# Patient Record
Sex: FEMALE | Race: WHITE | NOT HISPANIC OR LATINO
[De-identification: names, ages, dates, MRNs, and addresses within clinical notes are randomized per-mention and may not be internally consistent; named-entity substitution may affect disease eponyms.]

---

## 2018-06-11 ENCOUNTER — APPOINTMENT (OUTPATIENT)
Dept: OBGYN | Facility: CLINIC | Age: 63
End: 2018-06-11
Payer: COMMERCIAL

## 2018-06-11 VITALS
WEIGHT: 125 LBS | HEIGHT: 62 IN | BODY MASS INDEX: 23 KG/M2 | SYSTOLIC BLOOD PRESSURE: 114 MMHG | DIASTOLIC BLOOD PRESSURE: 70 MMHG | HEART RATE: 55 BPM

## 2018-06-11 DIAGNOSIS — Z82.49 FAMILY HISTORY OF ISCHEMIC HEART DISEASE AND OTHER DISEASES OF THE CIRCULATORY SYSTEM: ICD-10-CM

## 2018-06-11 PROCEDURE — 99396 PREV VISIT EST AGE 40-64: CPT

## 2018-07-13 LAB
CYTOLOGY CVX/VAG DOC THIN PREP: NORMAL
HPV HIGH+LOW RISK DNA PNL CVX: NOT DETECTED

## 2018-10-30 ENCOUNTER — APPOINTMENT (OUTPATIENT)
Dept: OBGYN | Facility: CLINIC | Age: 63
End: 2018-10-30
Payer: COMMERCIAL

## 2018-10-30 VITALS
SYSTOLIC BLOOD PRESSURE: 109 MMHG | DIASTOLIC BLOOD PRESSURE: 71 MMHG | WEIGHT: 128.38 LBS | BODY MASS INDEX: 23.62 KG/M2 | HEIGHT: 62 IN | HEART RATE: 62 BPM

## 2018-10-30 DIAGNOSIS — N64.59 OTHER SIGNS AND SYMPTOMS IN BREAST: ICD-10-CM

## 2018-10-30 PROCEDURE — 99213 OFFICE O/P EST LOW 20 MIN: CPT

## 2018-11-01 ENCOUNTER — OTHER (OUTPATIENT)
Age: 63
End: 2018-11-01

## 2018-11-06 ENCOUNTER — OTHER (OUTPATIENT)
Age: 63
End: 2018-11-06

## 2018-11-06 ENCOUNTER — APPOINTMENT (OUTPATIENT)
Dept: ULTRASOUND IMAGING | Facility: CLINIC | Age: 63
End: 2018-11-06
Payer: COMMERCIAL

## 2018-11-06 ENCOUNTER — APPOINTMENT (OUTPATIENT)
Dept: MAMMOGRAPHY | Facility: CLINIC | Age: 63
End: 2018-11-06
Payer: COMMERCIAL

## 2018-11-06 ENCOUNTER — OUTPATIENT (OUTPATIENT)
Dept: OUTPATIENT SERVICES | Facility: HOSPITAL | Age: 63
LOS: 1 days | End: 2018-11-06
Payer: COMMERCIAL

## 2018-11-06 DIAGNOSIS — N64.59 OTHER SIGNS AND SYMPTOMS IN BREAST: ICD-10-CM

## 2018-11-06 PROCEDURE — 76641 ULTRASOUND BREAST COMPLETE: CPT

## 2018-11-06 PROCEDURE — G0279: CPT | Mod: 26

## 2018-11-06 PROCEDURE — 77065 DX MAMMO INCL CAD UNI: CPT | Mod: 26,LT

## 2018-11-06 PROCEDURE — 77065 DX MAMMO INCL CAD UNI: CPT

## 2018-11-06 PROCEDURE — G0279: CPT

## 2018-11-06 PROCEDURE — 76641 ULTRASOUND BREAST COMPLETE: CPT | Mod: 26,LT

## 2018-11-28 ENCOUNTER — APPOINTMENT (OUTPATIENT)
Dept: MRI IMAGING | Facility: CLINIC | Age: 63
End: 2018-11-28
Payer: COMMERCIAL

## 2018-11-28 ENCOUNTER — OUTPATIENT (OUTPATIENT)
Dept: OUTPATIENT SERVICES | Facility: HOSPITAL | Age: 63
LOS: 1 days | End: 2018-11-28
Payer: COMMERCIAL

## 2018-11-28 DIAGNOSIS — Z00.8 ENCOUNTER FOR OTHER GENERAL EXAMINATION: ICD-10-CM

## 2018-11-28 PROCEDURE — C8908: CPT

## 2018-11-28 PROCEDURE — 77059 MRI BREAST BILATERAL: CPT | Mod: 26

## 2018-11-28 PROCEDURE — C8937: CPT

## 2018-11-28 PROCEDURE — 0159T: CPT | Mod: 26

## 2019-08-01 ENCOUNTER — APPOINTMENT (OUTPATIENT)
Dept: OBGYN | Facility: CLINIC | Age: 64
End: 2019-08-01
Payer: COMMERCIAL

## 2019-08-01 ENCOUNTER — OTHER (OUTPATIENT)
Age: 64
End: 2019-08-01

## 2019-08-01 VITALS
DIASTOLIC BLOOD PRESSURE: 74 MMHG | WEIGHT: 129 LBS | BODY MASS INDEX: 23.74 KG/M2 | HEIGHT: 62 IN | SYSTOLIC BLOOD PRESSURE: 122 MMHG | HEART RATE: 61 BPM

## 2019-08-01 DIAGNOSIS — M85.80 OTHER SPECIFIED DISORDERS OF BONE DENSITY AND STRUCTURE, UNSPECIFIED SITE: ICD-10-CM

## 2019-08-01 DIAGNOSIS — D49.59 NEOPLASM UNSPECIFIED BEHAVIOR OF OTHER GENITOURINARY ORGAN: ICD-10-CM

## 2019-08-01 PROCEDURE — 99397 PER PM REEVAL EST PAT 65+ YR: CPT

## 2019-08-01 RX ORDER — AZITHROMYCIN 250 MG/1
250 TABLET, FILM COATED ORAL
Qty: 6 | Refills: 0 | Status: DISCONTINUED | COMMUNITY
Start: 2019-05-02

## 2019-08-01 RX ORDER — HYDROXYUREA 500 MG/1
500 CAPSULE ORAL
Qty: 60 | Refills: 0 | Status: ACTIVE | COMMUNITY
Start: 2019-04-16

## 2019-08-01 NOTE — HISTORY OF PRESENT ILLNESS
[___ Year(s) Ago] : [unfilled] year(s) ago [Last Mammogram ___] : Last Mammogram was [unfilled] [Last Bone Density ___] : Last bone density studies [unfilled] [Last Pap ___] : Last cervical pap smear was [unfilled] [Last Colonoscopy ___] : Last colonoscopy [unfilled]

## 2019-08-01 NOTE — PHYSICAL EXAM
[Awake] : awake [Alert] : alert [Soft] : soft [No Lesions] : no genitalia lesions [Normal] : cervix [Labia Minora] : labia minora [Labia Majora] : labia major [Atrophy] : atrophy [No Bleeding] : there was no active vaginal bleeding [Mass] : no breast mass [Nipple Discharge] : no nipple discharge [Axillary LAD] : no axillary lymphadenopathy [Tender] : non tender [Distended] : not distended [Depressed Mood] : not depressed [Flat Affect] : affect not flat [Pap Obtained] : a Pap smear was not performed [Motion Tenderness] : there was no cervical motion tenderness [Tenderness] : nontender [Adnexa Tenderness] : were not tender

## 2019-10-01 ENCOUNTER — OTHER (OUTPATIENT)
Age: 64
End: 2019-10-01

## 2019-10-01 DIAGNOSIS — R92.2 INCONCLUSIVE MAMMOGRAM: ICD-10-CM

## 2019-10-17 ENCOUNTER — FORM ENCOUNTER (OUTPATIENT)
Age: 64
End: 2019-10-17

## 2019-10-18 ENCOUNTER — OUTPATIENT (OUTPATIENT)
Dept: OUTPATIENT SERVICES | Facility: HOSPITAL | Age: 64
LOS: 1 days | End: 2019-10-18
Payer: COMMERCIAL

## 2019-10-18 ENCOUNTER — APPOINTMENT (OUTPATIENT)
Dept: RADIOLOGY | Facility: CLINIC | Age: 64
End: 2019-10-18
Payer: COMMERCIAL

## 2019-10-18 ENCOUNTER — APPOINTMENT (OUTPATIENT)
Dept: ULTRASOUND IMAGING | Facility: CLINIC | Age: 64
End: 2019-10-18
Payer: COMMERCIAL

## 2019-10-18 ENCOUNTER — APPOINTMENT (OUTPATIENT)
Dept: MAMMOGRAPHY | Facility: CLINIC | Age: 64
End: 2019-10-18
Payer: COMMERCIAL

## 2019-10-18 DIAGNOSIS — Z00.00 ENCOUNTER FOR GENERAL ADULT MEDICAL EXAMINATION WITHOUT ABNORMAL FINDINGS: ICD-10-CM

## 2019-10-18 DIAGNOSIS — M85.80 OTHER SPECIFIED DISORDERS OF BONE DENSITY AND STRUCTURE, UNSPECIFIED SITE: ICD-10-CM

## 2019-10-18 DIAGNOSIS — R92.2 INCONCLUSIVE MAMMOGRAM: ICD-10-CM

## 2019-10-18 PROCEDURE — 77067 SCR MAMMO BI INCL CAD: CPT | Mod: 26

## 2019-10-18 PROCEDURE — 77063 BREAST TOMOSYNTHESIS BI: CPT | Mod: 26

## 2019-10-18 PROCEDURE — 77067 SCR MAMMO BI INCL CAD: CPT

## 2019-10-18 PROCEDURE — 77063 BREAST TOMOSYNTHESIS BI: CPT

## 2019-10-18 PROCEDURE — 77080 DXA BONE DENSITY AXIAL: CPT | Mod: 26

## 2019-10-18 PROCEDURE — 76641 ULTRASOUND BREAST COMPLETE: CPT

## 2019-10-18 PROCEDURE — 76641 ULTRASOUND BREAST COMPLETE: CPT | Mod: 26,50

## 2019-10-18 PROCEDURE — 77080 DXA BONE DENSITY AXIAL: CPT

## 2019-10-21 ENCOUNTER — OTHER (OUTPATIENT)
Age: 64
End: 2019-10-21

## 2020-11-05 ENCOUNTER — NON-APPOINTMENT (OUTPATIENT)
Age: 65
End: 2020-11-05

## 2020-11-13 ENCOUNTER — NON-APPOINTMENT (OUTPATIENT)
Age: 65
End: 2020-11-13

## 2020-12-01 ENCOUNTER — RESULT REVIEW (OUTPATIENT)
Age: 65
End: 2020-12-01

## 2020-12-01 ENCOUNTER — APPOINTMENT (OUTPATIENT)
Dept: ULTRASOUND IMAGING | Facility: CLINIC | Age: 65
End: 2020-12-01
Payer: COMMERCIAL

## 2020-12-01 ENCOUNTER — OUTPATIENT (OUTPATIENT)
Dept: OUTPATIENT SERVICES | Facility: HOSPITAL | Age: 65
LOS: 1 days | End: 2020-12-01
Payer: COMMERCIAL

## 2020-12-01 ENCOUNTER — APPOINTMENT (OUTPATIENT)
Dept: MAMMOGRAPHY | Facility: CLINIC | Age: 65
End: 2020-12-01
Payer: COMMERCIAL

## 2020-12-01 DIAGNOSIS — Z00.00 ENCOUNTER FOR GENERAL ADULT MEDICAL EXAMINATION WITHOUT ABNORMAL FINDINGS: ICD-10-CM

## 2020-12-01 DIAGNOSIS — R92.2 INCONCLUSIVE MAMMOGRAM: ICD-10-CM

## 2020-12-01 DIAGNOSIS — Z00.8 ENCOUNTER FOR OTHER GENERAL EXAMINATION: ICD-10-CM

## 2020-12-01 PROCEDURE — 77063 BREAST TOMOSYNTHESIS BI: CPT

## 2020-12-01 PROCEDURE — 76641 ULTRASOUND BREAST COMPLETE: CPT

## 2020-12-01 PROCEDURE — 77063 BREAST TOMOSYNTHESIS BI: CPT | Mod: 26

## 2020-12-01 PROCEDURE — 76641 ULTRASOUND BREAST COMPLETE: CPT | Mod: 26,50

## 2020-12-01 PROCEDURE — 77067 SCR MAMMO BI INCL CAD: CPT | Mod: 26

## 2020-12-01 PROCEDURE — 77067 SCR MAMMO BI INCL CAD: CPT

## 2021-11-18 ENCOUNTER — APPOINTMENT (OUTPATIENT)
Dept: OBGYN | Facility: CLINIC | Age: 66
End: 2021-11-18
Payer: COMMERCIAL

## 2021-11-18 ENCOUNTER — NON-APPOINTMENT (OUTPATIENT)
Age: 66
End: 2021-11-18

## 2021-11-18 VITALS
SYSTOLIC BLOOD PRESSURE: 123 MMHG | DIASTOLIC BLOOD PRESSURE: 76 MMHG | HEIGHT: 62 IN | HEART RATE: 58 BPM | BODY MASS INDEX: 23.37 KG/M2 | WEIGHT: 127 LBS

## 2021-11-18 PROCEDURE — 99397 PER PM REEVAL EST PAT 65+ YR: CPT

## 2021-11-18 RX ORDER — PANTOPRAZOLE 40 MG/1
40 TABLET, DELAYED RELEASE ORAL
Qty: 30 | Refills: 0 | Status: DISCONTINUED | COMMUNITY
Start: 2019-06-25 | End: 2021-11-18

## 2021-11-18 RX ORDER — CHROMIUM 200 MCG
TABLET ORAL
Refills: 0 | Status: ACTIVE | COMMUNITY

## 2021-11-18 RX ORDER — FOLIC ACID 20 MG
CAPSULE ORAL
Refills: 0 | Status: ACTIVE | COMMUNITY

## 2021-11-18 RX ORDER — ASPIRIN 81 MG/1
81 TABLET ORAL
Refills: 0 | Status: ACTIVE | COMMUNITY

## 2021-11-18 NOTE — PHYSICAL EXAM
[Appropriately responsive] : appropriately responsive [Alert] : alert [No Acute Distress] : no acute distress [Soft] : soft [Non-tender] : non-tender [No Lesions] : no lesions [Oriented x3] : oriented x3 [Examination Of The Breasts] : a normal appearance [No Masses] : no breast masses were palpable [Labia Majora] : normal [Labia Minora] : normal [Atrophy] : atrophy [Normal] : normal [Uterine Adnexae] : non-palpable [Tenderness] : nontender

## 2021-11-19 LAB — 25(OH)D3 SERPL-MCNC: 37.2 NG/ML

## 2021-11-22 LAB — HPV HIGH+LOW RISK DNA PNL CVX: NOT DETECTED

## 2021-11-24 LAB — CYTOLOGY CVX/VAG DOC THIN PREP: ABNORMAL

## 2021-12-07 ENCOUNTER — RESULT REVIEW (OUTPATIENT)
Age: 66
End: 2021-12-07

## 2021-12-07 ENCOUNTER — APPOINTMENT (OUTPATIENT)
Dept: ULTRASOUND IMAGING | Facility: CLINIC | Age: 66
End: 2021-12-07
Payer: COMMERCIAL

## 2021-12-07 ENCOUNTER — OUTPATIENT (OUTPATIENT)
Dept: OUTPATIENT SERVICES | Facility: HOSPITAL | Age: 66
LOS: 1 days | End: 2021-12-07
Payer: COMMERCIAL

## 2021-12-07 ENCOUNTER — APPOINTMENT (OUTPATIENT)
Dept: MAMMOGRAPHY | Facility: CLINIC | Age: 66
End: 2021-12-07
Payer: COMMERCIAL

## 2021-12-07 DIAGNOSIS — Z01.419 ENCOUNTER FOR GYNECOLOGICAL EXAMINATION (GENERAL) (ROUTINE) WITHOUT ABNORMAL FINDINGS: ICD-10-CM

## 2021-12-07 PROCEDURE — 77063 BREAST TOMOSYNTHESIS BI: CPT | Mod: 26

## 2021-12-07 PROCEDURE — 77063 BREAST TOMOSYNTHESIS BI: CPT

## 2021-12-07 PROCEDURE — 77067 SCR MAMMO BI INCL CAD: CPT | Mod: 26

## 2021-12-07 PROCEDURE — 76641 ULTRASOUND BREAST COMPLETE: CPT | Mod: 26,50

## 2021-12-07 PROCEDURE — 77067 SCR MAMMO BI INCL CAD: CPT

## 2021-12-07 PROCEDURE — 76641 ULTRASOUND BREAST COMPLETE: CPT

## 2021-12-08 ENCOUNTER — NON-APPOINTMENT (OUTPATIENT)
Age: 66
End: 2021-12-08

## 2021-12-16 ENCOUNTER — OUTPATIENT (OUTPATIENT)
Dept: OUTPATIENT SERVICES | Facility: HOSPITAL | Age: 66
LOS: 1 days | End: 2021-12-16
Payer: COMMERCIAL

## 2021-12-16 ENCOUNTER — APPOINTMENT (OUTPATIENT)
Dept: RADIOLOGY | Facility: CLINIC | Age: 66
End: 2021-12-16
Payer: COMMERCIAL

## 2021-12-16 DIAGNOSIS — Z00.00 ENCOUNTER FOR GENERAL ADULT MEDICAL EXAMINATION WITHOUT ABNORMAL FINDINGS: ICD-10-CM

## 2021-12-16 PROCEDURE — 77085 DXA BONE DENSITY AXL VRT FX: CPT | Mod: 26

## 2021-12-16 PROCEDURE — 77085 DXA BONE DENSITY AXL VRT FX: CPT

## 2022-12-22 ENCOUNTER — APPOINTMENT (OUTPATIENT)
Dept: MAMMOGRAPHY | Facility: CLINIC | Age: 67
End: 2022-12-22

## 2022-12-22 ENCOUNTER — APPOINTMENT (OUTPATIENT)
Dept: ULTRASOUND IMAGING | Facility: CLINIC | Age: 67
End: 2022-12-22

## 2022-12-22 ENCOUNTER — OUTPATIENT (OUTPATIENT)
Dept: OUTPATIENT SERVICES | Facility: HOSPITAL | Age: 67
LOS: 1 days | End: 2022-12-22
Payer: COMMERCIAL

## 2022-12-22 DIAGNOSIS — Z00.8 ENCOUNTER FOR OTHER GENERAL EXAMINATION: ICD-10-CM

## 2022-12-22 PROCEDURE — 77063 BREAST TOMOSYNTHESIS BI: CPT | Mod: 26

## 2022-12-22 PROCEDURE — 77067 SCR MAMMO BI INCL CAD: CPT | Mod: 26

## 2022-12-22 PROCEDURE — 76641 ULTRASOUND BREAST COMPLETE: CPT | Mod: 26,50

## 2022-12-22 PROCEDURE — 76641 ULTRASOUND BREAST COMPLETE: CPT

## 2022-12-22 PROCEDURE — 77067 SCR MAMMO BI INCL CAD: CPT

## 2022-12-22 PROCEDURE — 77063 BREAST TOMOSYNTHESIS BI: CPT

## 2023-06-19 ENCOUNTER — OUTPATIENT (OUTPATIENT)
Dept: OUTPATIENT SERVICES | Facility: HOSPITAL | Age: 68
LOS: 1 days | End: 2023-06-19

## 2023-06-19 ENCOUNTER — APPOINTMENT (OUTPATIENT)
Dept: ULTRASOUND IMAGING | Facility: CLINIC | Age: 68
End: 2023-06-19
Payer: COMMERCIAL

## 2023-06-19 DIAGNOSIS — Z00.8 ENCOUNTER FOR OTHER GENERAL EXAMINATION: ICD-10-CM

## 2023-06-19 PROCEDURE — 76882 US LMTD JT/FCL EVL NVASC XTR: CPT | Mod: 26,RT

## 2024-01-05 DIAGNOSIS — Z12.39 ENCOUNTER FOR OTHER SCREENING FOR MALIGNANT NEOPLASM OF BREAST: ICD-10-CM

## 2024-01-29 ENCOUNTER — OUTPATIENT (OUTPATIENT)
Dept: OUTPATIENT SERVICES | Facility: HOSPITAL | Age: 69
LOS: 1 days | End: 2024-01-29
Payer: COMMERCIAL

## 2024-01-29 ENCOUNTER — APPOINTMENT (OUTPATIENT)
Dept: MAMMOGRAPHY | Facility: CLINIC | Age: 69
End: 2024-01-29
Payer: COMMERCIAL

## 2024-01-29 ENCOUNTER — APPOINTMENT (OUTPATIENT)
Dept: ULTRASOUND IMAGING | Facility: CLINIC | Age: 69
End: 2024-01-29
Payer: COMMERCIAL

## 2024-01-29 ENCOUNTER — RESULT REVIEW (OUTPATIENT)
Age: 69
End: 2024-01-29

## 2024-01-29 DIAGNOSIS — Z00.8 ENCOUNTER FOR OTHER GENERAL EXAMINATION: ICD-10-CM

## 2024-01-29 DIAGNOSIS — R92.30 DENSE BREASTS, UNSPECIFIED: ICD-10-CM

## 2024-01-29 DIAGNOSIS — Z12.39 ENCOUNTER FOR OTHER SCREENING FOR MALIGNANT NEOPLASM OF BREAST: ICD-10-CM

## 2024-01-29 PROCEDURE — 77063 BREAST TOMOSYNTHESIS BI: CPT

## 2024-01-29 PROCEDURE — 76641 ULTRASOUND BREAST COMPLETE: CPT | Mod: 26,50

## 2024-01-29 PROCEDURE — 77063 BREAST TOMOSYNTHESIS BI: CPT | Mod: 26

## 2024-01-29 PROCEDURE — 77067 SCR MAMMO BI INCL CAD: CPT | Mod: 26

## 2024-01-29 PROCEDURE — 76641 ULTRASOUND BREAST COMPLETE: CPT

## 2024-01-29 PROCEDURE — 77067 SCR MAMMO BI INCL CAD: CPT

## 2024-02-01 ENCOUNTER — APPOINTMENT (OUTPATIENT)
Dept: ULTRASOUND IMAGING | Facility: CLINIC | Age: 69
End: 2024-02-01
Payer: COMMERCIAL

## 2024-02-01 ENCOUNTER — RESULT REVIEW (OUTPATIENT)
Age: 69
End: 2024-02-01

## 2024-02-01 ENCOUNTER — OUTPATIENT (OUTPATIENT)
Dept: OUTPATIENT SERVICES | Facility: HOSPITAL | Age: 69
LOS: 1 days | End: 2024-02-01
Payer: COMMERCIAL

## 2024-02-01 DIAGNOSIS — Z00.8 ENCOUNTER FOR OTHER GENERAL EXAMINATION: ICD-10-CM

## 2024-02-01 PROCEDURE — 76641 ULTRASOUND BREAST COMPLETE: CPT

## 2024-02-01 PROCEDURE — 76642 ULTRASOUND BREAST LIMITED: CPT | Mod: 26,LT

## 2024-02-01 PROCEDURE — 76642 ULTRASOUND BREAST LIMITED: CPT

## 2024-02-02 PROBLEM — N64.9 BREAST DISORDER: Status: ACTIVE | Noted: 2024-02-02

## 2024-02-06 ENCOUNTER — APPOINTMENT (OUTPATIENT)
Dept: ULTRASOUND IMAGING | Facility: CLINIC | Age: 69
End: 2024-02-06
Payer: COMMERCIAL

## 2024-02-06 ENCOUNTER — OUTPATIENT (OUTPATIENT)
Dept: OUTPATIENT SERVICES | Facility: HOSPITAL | Age: 69
LOS: 1 days | End: 2024-02-06
Payer: COMMERCIAL

## 2024-02-06 ENCOUNTER — RESULT REVIEW (OUTPATIENT)
Age: 69
End: 2024-02-06

## 2024-02-06 DIAGNOSIS — Z00.8 ENCOUNTER FOR OTHER GENERAL EXAMINATION: ICD-10-CM

## 2024-02-06 DIAGNOSIS — N64.9 DISORDER OF BREAST, UNSPECIFIED: ICD-10-CM

## 2024-02-06 PROCEDURE — 19083 BX BREAST 1ST LESION US IMAG: CPT | Mod: LT

## 2024-02-06 PROCEDURE — A4648: CPT

## 2024-02-06 PROCEDURE — 19083 BX BREAST 1ST LESION US IMAG: CPT

## 2024-02-06 PROCEDURE — 77065 DX MAMMO INCL CAD UNI: CPT

## 2024-02-06 PROCEDURE — 77065 DX MAMMO INCL CAD UNI: CPT | Mod: 26,LT

## 2024-02-06 PROCEDURE — 88305 TISSUE EXAM BY PATHOLOGIST: CPT

## 2024-02-06 PROCEDURE — 88305 TISSUE EXAM BY PATHOLOGIST: CPT | Mod: 26

## 2024-03-12 ENCOUNTER — APPOINTMENT (OUTPATIENT)
Dept: OBGYN | Facility: CLINIC | Age: 69
End: 2024-03-12
Payer: COMMERCIAL

## 2024-03-12 ENCOUNTER — NON-APPOINTMENT (OUTPATIENT)
Age: 69
End: 2024-03-12

## 2024-03-12 VITALS
WEIGHT: 131 LBS | SYSTOLIC BLOOD PRESSURE: 135 MMHG | BODY MASS INDEX: 24.11 KG/M2 | DIASTOLIC BLOOD PRESSURE: 77 MMHG | HEART RATE: 52 BPM | HEIGHT: 62 IN

## 2024-03-12 DIAGNOSIS — Z01.419 ENCOUNTER FOR GYNECOLOGICAL EXAMINATION (GENERAL) (ROUTINE) W/OUT ABNORMAL FINDINGS: ICD-10-CM

## 2024-03-12 DIAGNOSIS — D47.3 ESSENTIAL (HEMORRHAGIC) THROMBOCYTHEMIA: ICD-10-CM

## 2024-03-12 PROCEDURE — 99397 PER PM REEVAL EST PAT 65+ YR: CPT

## 2024-03-12 NOTE — PHYSICAL EXAM
[Chaperone Present] : A chaperone was present in the examining room during all aspects of the physical examination [Appropriately responsive] : appropriately responsive [Alert] : alert [No Acute Distress] : no acute distress [Soft] : soft [Non-tender] : non-tender [No Lesions] : no lesions [Oriented x3] : oriented x3 [Examination Of The Breasts] : a normal appearance [No Discharge] : no discharge [No Masses] : no breast masses were palpable [Labia Majora] : normal [Labia Minora] : normal [Atrophy] : atrophy [Normal] : normal [Uterine Adnexae] : non-palpable [Tenderness] : nontender

## 2024-03-12 NOTE — HISTORY OF PRESENT ILLNESS
[FreeTextEntry1] : This 68 year old P2  LMP- 2010, presents for annual. 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 [Mammogramdate] : Feb 2024 [TextBox_19] : see chart [BreastSonogramDate] : Feb 2024 [Peggyeardate] : Nov 2021 [TextBox_31] : neg pap and HPV [BoneDensityDate] : Dec 2021 [TextBox_37] : osteopenia [TextBox_43] : per pt " a couple of years ago"

## 2024-03-12 NOTE — PLAN
[FreeTextEntry1] : Screening bone dexa scan order in hand Reviewed pap smear guidelines Will CB for screening breast imaging orders RTO prn or for annual

## 2024-03-26 ENCOUNTER — OFFICE (OUTPATIENT)
Dept: URBAN - METROPOLITAN AREA CLINIC 104 | Facility: CLINIC | Age: 69
Setting detail: OPHTHALMOLOGY
End: 2024-03-26
Payer: COMMERCIAL

## 2024-03-26 DIAGNOSIS — H00.12: ICD-10-CM

## 2024-03-26 PROCEDURE — 92002 INTRM OPH EXAM NEW PATIENT: CPT | Performed by: OPTOMETRIST

## 2024-04-17 ENCOUNTER — OUTPATIENT (OUTPATIENT)
Dept: OUTPATIENT SERVICES | Facility: HOSPITAL | Age: 69
LOS: 1 days | End: 2024-04-17
Payer: COMMERCIAL

## 2024-04-17 ENCOUNTER — APPOINTMENT (OUTPATIENT)
Dept: RADIOLOGY | Facility: CLINIC | Age: 69
End: 2024-04-17
Payer: COMMERCIAL

## 2024-04-17 DIAGNOSIS — Z00.8 ENCOUNTER FOR OTHER GENERAL EXAMINATION: ICD-10-CM

## 2024-04-17 PROCEDURE — 77080 DXA BONE DENSITY AXIAL: CPT

## 2024-04-17 PROCEDURE — 77080 DXA BONE DENSITY AXIAL: CPT | Mod: 26

## 2024-04-25 ENCOUNTER — OFFICE (OUTPATIENT)
Dept: URBAN - METROPOLITAN AREA CLINIC 104 | Facility: CLINIC | Age: 69
Setting detail: OPHTHALMOLOGY
End: 2024-04-25
Payer: COMMERCIAL

## 2024-04-25 DIAGNOSIS — H00.12: ICD-10-CM

## 2024-04-25 PROCEDURE — 92012 INTRM OPH EXAM EST PATIENT: CPT | Performed by: OPTOMETRIST

## 2024-05-07 ENCOUNTER — APPOINTMENT (OUTPATIENT)
Dept: ENDOCRINOLOGY | Facility: CLINIC | Age: 69
End: 2024-05-07
Payer: COMMERCIAL

## 2024-05-07 VITALS
DIASTOLIC BLOOD PRESSURE: 62 MMHG | OXYGEN SATURATION: 96 % | WEIGHT: 125 LBS | BODY MASS INDEX: 23 KG/M2 | HEART RATE: 76 BPM | HEIGHT: 62 IN | SYSTOLIC BLOOD PRESSURE: 100 MMHG

## 2024-05-07 DIAGNOSIS — R79.89 OTHER SPECIFIED ABNORMAL FINDINGS OF BLOOD CHEMISTRY: ICD-10-CM

## 2024-05-07 DIAGNOSIS — M81.0 AGE-RELATED OSTEOPOROSIS W/OUT CURRENT PATHOLOGICAL FRACTURE: ICD-10-CM

## 2024-05-07 PROCEDURE — 99215 OFFICE O/P EST HI 40 MIN: CPT

## 2024-05-07 PROCEDURE — G2211 COMPLEX E/M VISIT ADD ON: CPT

## 2024-05-07 PROCEDURE — 99205 OFFICE O/P NEW HI 60 MIN: CPT

## 2024-05-07 NOTE — ASSESSMENT
[FreeTextEntry1] : The patient is referred for initial consultation for osteoporosis.  Patient has been told of low bone density or osteopenia on screening bone density as early as 2011. I do not have the bone density data / images available from that time. Last BMD shows osteoporosis so patient referred to discuss treatment.   BMD 4/17/2024 versus 2021 shows osteoporosis in the spine, -2.5, 3.7% decrease, osteopenia in the total hip, -1.5, 7.4% decrease, osteopenia in the femoral neck, -2.3. BMD results were discussed with the patient.   Given the patient's history and BMD, the patient is at an increased risk of future fracture. Options of medical therapy were discussed in detail including risks and benefits.   I discussed Rx with bisphosphonate therapy, including IV Reclast. Risks and benefits of bisphosphonate therapy were discussed including minor aches and pains, heartburn, gastroesophageal irritation (excluding IV Reclast), osteonecrosis of the jaw (ONJ), atypical femur fractures, and acute phase reaction (w/ IV Reclast only). The patient would benefit from bisphosphonate therapy with the expectation of a drug holiday within 3-5 years.   I discussed Rx with twice a year Prolia injection. Risks and benefits of Prolia discussed including ONJ and atypical femur fracture. I discussed that the patient cannot stop Prolia without expecting rapid bone loss and an increase in risk for future fracture unless they transition to another Rx. Furthermore, there is 10 year safety data for Prolia.    All questions were answered. Rx information handout provided. The patient understands and elects to do bloodwork first before making decision.    I discussed that weight bearing exercises, cardiovascular activities, and diet are beneficial to overall health, but are not adequate for bone density increase or alternative to osteoporosis medication.  History of vitamin D deficiency, was prescribed vitamin D3 50,000 IU daily in 2023, currently not on maintenance dose. I recommend supplementing with no more than 500 mg of Ca and 1000 IU of Vit D3 pending labs.   Draw labs today including CMP, PTH, Phos. I will monitor response to therapy with a baseline biochemical marker of bone turnover, CTx. Patient is aware marker may not be covered by insurance.   Follow up TBD. Repeat BMD in 1 year after starting therapy.  Shay MS, Bianca SL, Justine KL, Elenita EM, Moses KG,  AJ, Nikhil ES. The clinician's guide to prevention and treatment of osteoporosis. Osteoporosis Int. 2022 Oct;33(10):6476-6864.

## 2024-05-07 NOTE — PROCEDURE
[FreeTextEntry1] : Bone Mineral Density: 05/07/2024  Indication: vs 2021 Spine (L1, L2, L3, L4): -2.5, osteoporosis, (-3.7%) Total hip: -1.5, osteopenia, (-7.4%) Femoral neck: -2.3, osteopenia Proximal radius: not measured

## 2024-05-07 NOTE — REASON FOR VISIT
[Consultation] : a consultation visit [Osteoporosis] : osteoporosis [FreeTextEntry2] : Dr. Audrey Perez

## 2024-05-07 NOTE — PHYSICAL EXAM
[Acanthosis Nigricans] : no acanthosis nigricans [de-identified] : 2-3 fingerbreadths ribs to pelvis

## 2024-05-07 NOTE — HISTORY OF PRESENT ILLNESS
[FreeTextEntry1] : CHIEF COMPLAINT: Osteoporosis   HISTORY OF PRESENT ILLNESS:   Patient has been told of low bone density or osteopenia on screening bone density as early as 2011. I do not have the bone density data / images available from that time. Last BMD shows osteoporosis in the spine. Patient was referred here for treatment.   Patient is post-menopausal since age mid 50s. No HRT. Denies disordered eating or amenorrhea during reproductive years. UTD mammography. Denies personal or family history of breast cancer, history of radiation therapy.   Pt active. Activity includes weightlifting, yoga, walking. Denies history of fractures or frequent falls. Denies use of assistive device such as walker or cane.  Denies family history of osteoporosis, parental hip fracture.   Diet regular includes some dairy. Patient also takes over the counter calcium supplement, unsure of dose. History of vitamin D deficiency, was prescribed vitamin D3 50,000 IU daily in 2023, currently not on maintenance dose. Denies history of kidney stones, excessive alcohol intake, excessive soda intake, celiac disease, malabsorption, nutritional deficiencies, GIB, stomach ulcers.   Former smoker.   PMHx:  H/o LE DVT after pregnancy  Thrombocytopenia, on hydroxyurea Takes aspirin 81 mg, half a tablet per day Folic acid Denies Paget's Dz, hyperparathyroidism, diabetes, CVD, lung dz, and chronic steroid use.  PSHx: Tubal ligation, 1980s Floor extremity vein surgery, 1990s Trigger finger release, 2007  No major dental work planned.

## 2024-05-20 LAB
ALBUMIN SERPL ELPH-MCNC: 4.5 G/DL
ALP BLD-CCNC: 73 U/L
ALT SERPL-CCNC: 20 U/L
ANION GAP SERPL CALC-SCNC: 11 MMOL/L
AST SERPL-CCNC: 22 U/L
BILIRUB SERPL-MCNC: 0.3 MG/DL
BUN SERPL-MCNC: 12 MG/DL
CALCIUM SERPL-MCNC: 9.5 MG/DL
CALCIUM SERPL-MCNC: 9.5 MG/DL
CHLORIDE SERPL-SCNC: 105 MMOL/L
CO2 SERPL-SCNC: 24 MMOL/L
CREAT SERPL-MCNC: 0.79 MG/DL
EGFR: 80 ML/MIN/1.73M2
GLUCOSE SERPL-MCNC: 69 MG/DL
PARATHYROID HORMONE INTACT: 48 PG/ML
PHOSPHATE SERPL-MCNC: 3.5 MG/DL
POTASSIUM SERPL-SCNC: 4.1 MMOL/L
PROT SERPL-MCNC: 6.6 G/DL
SODIUM SERPL-SCNC: 140 MMOL/L

## 2024-05-23 LAB — COLLAGEN CTX SERPL-MCNC: 238 PG/ML

## 2025-03-13 ENCOUNTER — APPOINTMENT (OUTPATIENT)
Dept: MAMMOGRAPHY | Facility: CLINIC | Age: 70
End: 2025-03-13
Payer: COMMERCIAL

## 2025-03-13 ENCOUNTER — RESULT REVIEW (OUTPATIENT)
Age: 70
End: 2025-03-13

## 2025-03-13 ENCOUNTER — APPOINTMENT (OUTPATIENT)
Dept: ULTRASOUND IMAGING | Facility: CLINIC | Age: 70
End: 2025-03-13
Payer: COMMERCIAL

## 2025-03-13 ENCOUNTER — OUTPATIENT (OUTPATIENT)
Dept: OUTPATIENT SERVICES | Facility: HOSPITAL | Age: 70
LOS: 1 days | End: 2025-03-13
Payer: COMMERCIAL

## 2025-03-13 DIAGNOSIS — Z00.8 ENCOUNTER FOR OTHER GENERAL EXAMINATION: ICD-10-CM

## 2025-03-13 DIAGNOSIS — R92.30 DENSE BREASTS, UNSPECIFIED: ICD-10-CM

## 2025-03-13 PROCEDURE — 77063 BREAST TOMOSYNTHESIS BI: CPT | Mod: 26

## 2025-03-13 PROCEDURE — 76641 ULTRASOUND BREAST COMPLETE: CPT

## 2025-03-13 PROCEDURE — 76641 ULTRASOUND BREAST COMPLETE: CPT | Mod: 26,50

## 2025-03-13 PROCEDURE — 77067 SCR MAMMO BI INCL CAD: CPT

## 2025-03-13 PROCEDURE — 77067 SCR MAMMO BI INCL CAD: CPT | Mod: 26

## 2025-03-13 PROCEDURE — 77063 BREAST TOMOSYNTHESIS BI: CPT

## 2025-03-17 NOTE — HISTORY OF PRESENT ILLNESS
For information on Fall & Injury Prevention, visit: [email or web address] OR  [email or web address] OR  https://www.cdc.gov/steadi/patient.html [Mammogramdate] : Dec 2020 [TextBox_19] : BIRAD 2 [BreastSonogramDate] : Dec 2020 [PapSmeardate] : June 2018 [TextBox_31] : neg pap and HPV [BoneDensityDate] : 10/18/19 [TextBox_37] : osteopenic [ColonoscopyDate] : per pt [TextBox_43] : Jan 2020

## 2025-05-15 ENCOUNTER — LABORATORY RESULT (OUTPATIENT)
Age: 70
End: 2025-05-15

## 2025-05-15 ENCOUNTER — NON-APPOINTMENT (OUTPATIENT)
Age: 70
End: 2025-05-15

## 2025-05-15 ENCOUNTER — APPOINTMENT (OUTPATIENT)
Dept: INTERNAL MEDICINE | Facility: CLINIC | Age: 70
End: 2025-05-15
Payer: COMMERCIAL

## 2025-05-15 VITALS
BODY MASS INDEX: 22.32 KG/M2 | DIASTOLIC BLOOD PRESSURE: 70 MMHG | HEIGHT: 63 IN | WEIGHT: 126 LBS | SYSTOLIC BLOOD PRESSURE: 125 MMHG

## 2025-05-15 DIAGNOSIS — Z00.00 ENCOUNTER FOR GENERAL ADULT MEDICAL EXAMINATION W/OUT ABNORMAL FINDINGS: ICD-10-CM

## 2025-05-15 DIAGNOSIS — R53.83 OTHER FATIGUE: ICD-10-CM

## 2025-05-15 DIAGNOSIS — R79.9 ABNORMAL FINDING OF BLOOD CHEMISTRY, UNSPECIFIED: ICD-10-CM

## 2025-05-15 DIAGNOSIS — D47.3 ESSENTIAL (HEMORRHAGIC) THROMBOCYTHEMIA: ICD-10-CM

## 2025-05-15 PROCEDURE — 36415 COLL VENOUS BLD VENIPUNCTURE: CPT

## 2025-05-15 PROCEDURE — 99387 INIT PM E/M NEW PAT 65+ YRS: CPT

## 2025-05-15 NOTE — HISTORY OF PRESENT ILLNESS
[FreeTextEntry1] : physical exam  [de-identified] : Ms. JAMESON SCHUSTER is a 69 year female with a PMH of Osteoporosis, ET, comes to the office for physical exam. Patient denies fever, cough SOB. No other complaints at this time.

## 2025-05-15 NOTE — HEALTH RISK ASSESSMENT
[Good] : ~his/her~  mood as  good [Monthly or less (1 pt)] : Monthly or less (1 point) [1 or 2 (0 pts)] : 1 or 2 (0 points) [Never (0 pts)] : Never (0 points) [No] : In the past 12 months have you used drugs other than those required for medical reasons? No [0] : 2) Feeling down, depressed, or hopeless: Not at all (0) [PHQ-2 Negative - No further assessment needed] : PHQ-2 Negative - No further assessment needed [Audit-CScore] : 1 [PEI3Xrsme] : 0 [Yes] : Reviewed medication list for presence of high-risk medications. [Opioids] : opioids [Never] : Never [NO] : No [Patient declined Low Dose CT Scan] : Patient declined Low Dose CT Scan [Patient declined Retinal Exam] : Patient declined Retinal Exam. [Patient reported mammogram was normal] : Patient reported mammogram was normal [Patient reported bone density results were abnormal] : Patient reported bone density results were abnormal [Patient reported colonoscopy was normal] : Patient reported colonoscopy was normal [HIV test declined] : HIV test declined [Hepatitis C test declined] : Hepatitis C test declined [MammogramDate] : 03/25 [MammogramComments] : ob/gyn [BoneDensityDate] : 03/24 [BoneDensityComments] : endocrinologist [ColonoscopyDate] : 2021 [ColonoscopyComments] : gastroenterologist Dr. Mendez

## 2025-05-15 NOTE — PLAN
[FreeTextEntry1] : In regards to patients Physical exam, routine blood work drawn, will review results with patient.  ET- patient will follow with Heme/Onc Dr. Arnoldo Medina and take Hydroxyurea as prescribed.   Osteoporosis- patient will follow with endocrinologist.   Counseling included abnormal lab results, differential diagnoses, treatment options, risks and benefits, lifestyle changes, current condition, medications, and dose adjustments.  The patient was interactive, attentive, asked questions, and verbalized understanding

## 2025-05-16 LAB
ALBUMIN SERPL ELPH-MCNC: 4.6 G/DL
ALP BLD-CCNC: 72 U/L
ALT SERPL-CCNC: 21 U/L
ANION GAP SERPL CALC-SCNC: 13 MMOL/L
AST SERPL-CCNC: 22 U/L
BILIRUB SERPL-MCNC: 0.3 MG/DL
BUN SERPL-MCNC: 18 MG/DL
CALCIUM SERPL-MCNC: 9.4 MG/DL
CHLORIDE SERPL-SCNC: 101 MMOL/L
CHOLEST SERPL-MCNC: 184 MG/DL
CO2 SERPL-SCNC: 24 MMOL/L
CREAT SERPL-MCNC: 0.78 MG/DL
EGFRCR SERPLBLD CKD-EPI 2021: 81 ML/MIN/1.73M2
ESTIMATED AVERAGE GLUCOSE: 114 MG/DL
GLUCOSE SERPL-MCNC: 58 MG/DL
HBA1C MFR BLD HPLC: 5.6 %
HDLC SERPL-MCNC: 81 MG/DL
LDLC SERPL-MCNC: 91 MG/DL
NONHDLC SERPL-MCNC: 103 MG/DL
POTASSIUM SERPL-SCNC: 4.3 MMOL/L
PROT SERPL-MCNC: 6.8 G/DL
SODIUM SERPL-SCNC: 138 MMOL/L
TRIGL SERPL-MCNC: 65 MG/DL
TSH SERPL-ACNC: 2.72 UIU/ML

## 2025-05-19 LAB
BASOPHILS # BLD AUTO: 0.03 K/UL
BASOPHILS NFR BLD AUTO: 0.9 %
EOSINOPHIL # BLD AUTO: 0.01 K/UL
EOSINOPHIL NFR BLD AUTO: 0.3 %
HCT VFR BLD CALC: 34.3 %
HGB BLD-MCNC: 11.2 G/DL
IMM GRANULOCYTES NFR BLD AUTO: 0.3 %
LYMPHOCYTES # BLD AUTO: 1.08 K/UL
LYMPHOCYTES NFR BLD AUTO: 31.4 %
MAN DIFF?: NORMAL
MCHC RBC-ENTMCNC: 32.7 G/DL
MCHC RBC-ENTMCNC: 38.1 PG
MCV RBC AUTO: 116.7 FL
MONOCYTES # BLD AUTO: 0.45 K/UL
MONOCYTES NFR BLD AUTO: 13.1 %
NEUTROPHILS # BLD AUTO: 1.86 K/UL
NEUTROPHILS NFR BLD AUTO: 54 %
PLATELET # BLD AUTO: 509 K/UL
RBC # BLD: 2.94 M/UL
RBC # FLD: 14.5 %
WBC # FLD AUTO: 3.44 K/UL

## 2025-08-28 ENCOUNTER — NON-APPOINTMENT (OUTPATIENT)
Age: 70
End: 2025-08-28